# Patient Record
Sex: MALE | Race: BLACK OR AFRICAN AMERICAN | NOT HISPANIC OR LATINO
[De-identification: names, ages, dates, MRNs, and addresses within clinical notes are randomized per-mention and may not be internally consistent; named-entity substitution may affect disease eponyms.]

---

## 2020-08-17 PROBLEM — Z00.00 ENCOUNTER FOR PREVENTIVE HEALTH EXAMINATION: Status: ACTIVE | Noted: 2020-08-17

## 2020-08-19 ENCOUNTER — APPOINTMENT (OUTPATIENT)
Dept: GASTROENTEROLOGY | Facility: CLINIC | Age: 40
End: 2020-08-19
Payer: COMMERCIAL

## 2020-08-19 VITALS
OXYGEN SATURATION: 99 % | HEART RATE: 76 BPM | BODY MASS INDEX: 41.75 KG/M2 | TEMPERATURE: 96.5 F | SYSTOLIC BLOOD PRESSURE: 110 MMHG | HEIGHT: 73 IN | DIASTOLIC BLOOD PRESSURE: 90 MMHG | WEIGHT: 315 LBS

## 2020-08-19 DIAGNOSIS — K43.9 VENTRAL HERNIA W/OUT OBSTRUCTION OR GANGRENE: ICD-10-CM

## 2020-08-19 PROCEDURE — 99203 OFFICE O/P NEW LOW 30 MIN: CPT

## 2020-08-19 PROCEDURE — 99243 OFF/OP CNSLTJ NEW/EST LOW 30: CPT

## 2020-08-19 RX ORDER — LISDEXAMFETAMINE DIMESYLATE 30 MG/1
30 CAPSULE ORAL
Refills: 0 | Status: ACTIVE | COMMUNITY

## 2020-08-19 NOTE — CONSULT LETTER
[Dear  ___] : Dear  [unfilled], [Consult Letter:] : I had the pleasure of evaluating your patient, [unfilled]. [Please see my note below.] : Please see my note below. [Consult Closing:] : Thank you very much for allowing me to participate in the care of this patient.  If you have any questions, please do not hesitate to contact me. [FreeTextEntry3] : Kaveh eMllo MD\par tel: [phone]\par fax: [phone]\par  [Sincerely,] : Sincerely,

## 2020-08-19 NOTE — HISTORY OF PRESENT ILLNESS
[de-identified] : ELVIRA SCHAEFER  is being evaluated at the request of Dr. Steve Darling  for an opinion re: abdominal wall hernia. Patients appendectomy in 2015.  Apparently needed removal of part of small and large intestine at that time secondary to ? perforation. Presents secondary to an enlarging ventral hernia. Denies nausea, vomiting, fever, chills, diarrhea, constipation, melena, hematemesis, BRBPR, abdominal pain\par

## 2020-08-19 NOTE — PHYSICAL EXAM
[General Appearance - Alert] : alert [General Appearance - In No Acute Distress] : in no acute distress [Sclera] : the sclera and conjunctiva were normal [Neck Appearance] : the appearance of the neck was normal [] : no respiratory distress [Abdomen Soft] : soft [FreeTextEntry1] : deferred [Abnormal Walk] : normal gait [Skin Color & Pigmentation] : normal skin color and pigmentation [No Focal Deficits] : no focal deficits [Oriented To Time, Place, And Person] : oriented to person, place, and time

## 2020-08-31 ENCOUNTER — APPOINTMENT (OUTPATIENT)
Dept: SURGERY | Facility: CLINIC | Age: 40
End: 2020-08-31
Payer: COMMERCIAL

## 2020-08-31 VITALS
DIASTOLIC BLOOD PRESSURE: 87 MMHG | SYSTOLIC BLOOD PRESSURE: 135 MMHG | HEIGHT: 73 IN | HEART RATE: 84 BPM | BODY MASS INDEX: 41.75 KG/M2 | WEIGHT: 315 LBS | OXYGEN SATURATION: 98 %

## 2020-08-31 DIAGNOSIS — K43.2 INCISIONAL HERNIA W/OUT OBSTRUCTION OR GANGRENE: ICD-10-CM

## 2020-08-31 PROCEDURE — 99203 OFFICE O/P NEW LOW 30 MIN: CPT

## 2020-08-31 NOTE — HISTORY OF PRESENT ILLNESS
[FreeTextEntry1] : 40 M with prior midline surgery for initially believed to be appendicitis then found to have right-sided diverticulitis according to patient. Since then patient has developed a bulge above the midline incisional scar and has been told he has a hernia. \par \par Patient has discomfort at the site with exertion. Works as a  so is active in his job. Used to exercise more but is less able now with hernia and has developed backpain as well. \par \par Weight has fluctuated significantly in the past, currently 315 with BMI of 41. Patient otherwise healthy. \par Has never had an obstruction from hernia, once went to ER due to pain. \par \par Has been considering weight loss surgery given his back pain and desire to be able to exercise again.

## 2020-08-31 NOTE — ASSESSMENT
[FreeTextEntry1] : 40 M with reducible incisional hernia for 3-4 years with significant discomfort and pain from hernia. Patient has BMI 41 and is interested in bariatric surgery. \par \par Discussed repair of incisional hernia including minimally invasive approaches with approximation of muscle and placement of mesh. \par \par Given elevated BMI, risk of repair is significantly increased. Agree with patient's assessment of need for bariatric surgery and will refer to Dr Mandujano for discussion of sleeve gastrectomy. \par \par Discussed symptoms of obstruction and incarceration but appears low risk given broadbased hernia. Will revisit hernia repair after patient has lost weight with bariatric surgery.

## 2020-08-31 NOTE — PHYSICAL EXAM
[Respiratory Effort] : normal respiratory effort [No Rash or Lesion] : No rash or lesion [Alert] : alert [Calm] : calm [de-identified] : Soft, nontender, nondistended, midline scar, incisional hernia at upper aspect of scar.  [de-identified] : No acute distress

## 2020-09-01 ENCOUNTER — APPOINTMENT (OUTPATIENT)
Dept: SURGERY | Facility: CLINIC | Age: 40
End: 2020-09-01
Payer: COMMERCIAL

## 2020-09-01 ENCOUNTER — APPOINTMENT (OUTPATIENT)
Dept: BARIATRICS | Facility: CLINIC | Age: 40
End: 2020-09-01
Payer: COMMERCIAL

## 2020-09-01 VITALS
HEART RATE: 77 BPM | DIASTOLIC BLOOD PRESSURE: 73 MMHG | OXYGEN SATURATION: 97 % | RESPIRATION RATE: 18 BRPM | WEIGHT: 315 LBS | BODY MASS INDEX: 41.75 KG/M2 | HEIGHT: 73 IN | SYSTOLIC BLOOD PRESSURE: 118 MMHG

## 2020-09-01 DIAGNOSIS — Z99.89 OBSTRUCTIVE SLEEP APNEA (ADULT) (PEDIATRIC): ICD-10-CM

## 2020-09-01 DIAGNOSIS — G47.33 OBSTRUCTIVE SLEEP APNEA (ADULT) (PEDIATRIC): ICD-10-CM

## 2020-09-01 DIAGNOSIS — E66.01 MORBID (SEVERE) OBESITY DUE TO EXCESS CALORIES: ICD-10-CM

## 2020-09-01 PROCEDURE — 97802 MEDICAL NUTRITION INDIV IN: CPT

## 2020-09-01 PROCEDURE — 99243 OFF/OP CNSLTJ NEW/EST LOW 30: CPT

## 2020-09-01 RX ORDER — LISDEXAMFETAMINE DIMESYLATE 30 MG/1
30 CAPSULE ORAL
Refills: 0 | Status: ACTIVE | COMMUNITY

## 2020-09-01 NOTE — REVIEW OF SYSTEMS
[Patient Intake Form Reviewed] : Patient intake form was reviewed [Hernia] : hernia [Anxiety] : anxiety [Depression] : depression [Negative] : Allergic/Immunologic

## 2020-09-01 NOTE — REASON FOR VISIT
[Initial Consult] : an initial consult for [Morbid Obesity (BMI>40)] : morbid obesity (bmi>40) [Spouse] : spouse

## 2020-09-01 NOTE — PLAN
[FreeTextEntry1] : ELVIRA is interested in the sleeve gastrectomy. We discussed the importance of regular visits and change in habits.  Begin medically supervised weight program.  Encouraged exercise.\par \par Will need the following tests prior to surgery:\par \par -bariatric blood work\par -chest Xray\par -EKG\par -nutrition evaluation\par -psychology evaluation\par -upper endoscopy\par -sleep study report\par -personal psychiatrist letter\par

## 2020-09-01 NOTE — ASSESSMENT
[FreeTextEntry1] : Weight/Diet History:  Ashley has tried multiple diets over the years, including  (OTC pills in 2000 and lost 30 lbs, NutriSystem in 2014 and lost 40 lbs, Diet  and exercise 2015 and lost 30 lbs. ). None has resulted in a successful weight management.   He went on His first diet at age 7____.  Current efforts and dietary changes include ( trying to focus but with covid 19 he reports that it has been very challenging.  )\par Usual body weight: 310-320    lbs.\par Lowest Adult weight:  195  lbs. (2000)\par Highest Adult weight: 320   lbs. \par \par B: 2 burgers on buns water\par 3PM:  [employer] water \par D: candy bar Works night s as \par \par  Eats fast foods 5 times a week\par  [place] out: 2 times a week\par Restaurant 2 times a week \par Wife cooks and grocery shops\par \par Eating Habits:\par Average number of meals/day: 2\par Average number of snacks/Day: 1-2 \par Grazing: no\par Sugar intake: no \par Binge Eating:  yes\par Emotional Eating: no\par Wake up during the night to eat: no\par Vomiting/Purging/Laxatives/Restriction: Denies\par Food Allergies: Shellfish\par Lactose Intolerance: Yes\par Exercise: Walking\par \par \par Issues Discussed:  The patient was provided written and verbal education regarding the () nutritional guidelines.   Special emphasis was placed on portion control, increased mastication, diet advancement, satiety cues, adequate protein intake, avoiding soft/liquid calories and concentrated sweets, choosing low/moderate fat foods, fluid guidelines, and the importance of lifelong vitamin and mineral supplementation.   The patient was advised of the potential for nutritional deficiencies, food intolerances strategies to reduce overeating and vomiting postoperatively.  Potential gastrointestinal difficulties were discussed with suggested remedies.  Written nutrition guidelines, menu plans, a shopping list, protein supplement literature and purchasing   information were all provided.  Realistic weight loss goals and habits of successful patients were discussed with patient, with strong emphasis placed on performing moderate to intense physical activity most days of the week, eating at regular intervals, and planning meals and snacks in advance.  The potential for weight gain or poor weight loss with dietary non-compliance was also covered.   The patient was informed that lifelong compliance to the recommended guidelines is essential for successful weight loss and healthy weight maintenance.  \par  \par Expectations/Readiness: Hilario demonstrated good comprehension of nutrition education provided. From a nutritional standpoint, this patient appears to be an appropriate candidate   for bariatric surgery.\par \par Recommendations: \par 1)	Replace breakfast with protein shake\par 2)	Pack a snack\par 3)	Continue with regular moderate physical exercise\par 4)	Adhere to 1800 calorie diet prior to surgery. Weigh in monthly to monitor progress.\par 5)            Adhere to Homework sheet \par \par    \par \par \par  \par \par

## 2020-09-01 NOTE — HISTORY OF PRESENT ILLNESS
[de-identified] : ELVIRA SCHAEFER is a 40 year M with severe obesity who is interested in learning about bariatric surgery.  He has tried many diets in the past, including Nutrisystem and  2,  with only limited success.\par

## 2020-09-01 NOTE — PHYSICAL EXAM
[Obese] : obese [Normal] : affect appropriate [de-identified] : soft, NT, ND, reducible incisional hernia in upper midline

## 2023-08-07 ENCOUNTER — NON-APPOINTMENT (OUTPATIENT)
Age: 43
End: 2023-08-07

## 2023-08-14 ENCOUNTER — APPOINTMENT (OUTPATIENT)
Dept: SURGERY | Facility: CLINIC | Age: 43
End: 2023-08-14
Payer: COMMERCIAL

## 2023-08-14 VITALS
HEART RATE: 85 BPM | HEIGHT: 73 IN | SYSTOLIC BLOOD PRESSURE: 122 MMHG | WEIGHT: 295 LBS | DIASTOLIC BLOOD PRESSURE: 84 MMHG | BODY MASS INDEX: 39.1 KG/M2

## 2023-08-14 DIAGNOSIS — R22.30 LOCALIZED SWELLING, MASS AND LUMP, UNSPECIFIED UPPER LIMB: ICD-10-CM

## 2023-08-14 PROCEDURE — 99213 OFFICE O/P EST LOW 20 MIN: CPT

## 2023-08-14 NOTE — PHYSICAL EXAM
[Respiratory Effort] : normal respiratory effort [Normal Rate and Rhythm] : normal rate and rhythm [Calm] : calm [de-identified] : NAD, well-appearing [de-identified] : Anicteric [de-identified] : nondistended  [de-identified] : dark discoloration over the lateral aspect of the left axially with a firm mass under the skin in the area, somewhat mobile; mildly tender, ~ 2x4cm; no fluctuance or overlying areas of drainage

## 2023-08-14 NOTE — ASSESSMENT
[FreeTextEntry1] : 42 yo M with recent admission for cellulitis of the left axilla with residual mass, possible induration related to original infection vs pathologic LN or mass.   Will try to obtain prior images from OSH to understand progression of infection and mass. Will obtain US within 2 weeks to see if continuing to improve/resolve. As of now, no indication for excision or biopsy, but if there is no improvement, would start with possible core biopsy. Will discuss results of imaging once completed.

## 2023-08-20 ENCOUNTER — RESULT REVIEW (OUTPATIENT)
Age: 43
End: 2023-08-20

## 2023-08-24 ENCOUNTER — NON-APPOINTMENT (OUTPATIENT)
Age: 43
End: 2023-08-24

## 2023-08-24 ENCOUNTER — TRANSCRIPTION ENCOUNTER (OUTPATIENT)
Age: 43
End: 2023-08-24

## 2023-08-30 ENCOUNTER — NON-APPOINTMENT (OUTPATIENT)
Age: 43
End: 2023-08-30